# Patient Record
Sex: MALE | Race: WHITE | Employment: FULL TIME | URBAN - METROPOLITAN AREA
[De-identification: names, ages, dates, MRNs, and addresses within clinical notes are randomized per-mention and may not be internally consistent; named-entity substitution may affect disease eponyms.]

---

## 2020-10-22 ENCOUNTER — APPOINTMENT (OUTPATIENT)
Dept: GENERAL RADIOLOGY | Age: 60
End: 2020-10-22
Payer: COMMERCIAL

## 2020-10-22 ENCOUNTER — HOSPITAL ENCOUNTER (OUTPATIENT)
Age: 60
Setting detail: OBSERVATION
Discharge: LEFT BEFORE DISCHARGE | End: 2020-10-23
Attending: STUDENT IN AN ORGANIZED HEALTH CARE EDUCATION/TRAINING PROGRAM | Admitting: HOSPITALIST
Payer: COMMERCIAL

## 2020-10-22 PROBLEM — R07.9 CHEST PAIN: Status: ACTIVE | Noted: 2020-10-22

## 2020-10-22 LAB
A/G RATIO: 1.6 (ref 1.1–2.2)
ALBUMIN SERPL-MCNC: 4.4 G/DL (ref 3.4–5)
ALP BLD-CCNC: 67 U/L (ref 40–129)
ALT SERPL-CCNC: 26 U/L (ref 10–40)
ANION GAP SERPL CALCULATED.3IONS-SCNC: 10 MMOL/L (ref 3–16)
AST SERPL-CCNC: 21 U/L (ref 15–37)
BASOPHILS ABSOLUTE: 0.1 K/UL (ref 0–0.2)
BASOPHILS RELATIVE PERCENT: 1.2 %
BILIRUB SERPL-MCNC: 0.5 MG/DL (ref 0–1)
BUN BLDV-MCNC: 17 MG/DL (ref 7–20)
CALCIUM SERPL-MCNC: 9.2 MG/DL (ref 8.3–10.6)
CHLORIDE BLD-SCNC: 102 MMOL/L (ref 99–110)
CO2: 25 MMOL/L (ref 21–32)
CREAT SERPL-MCNC: 0.9 MG/DL (ref 0.9–1.3)
EOSINOPHILS ABSOLUTE: 0.2 K/UL (ref 0–0.6)
EOSINOPHILS RELATIVE PERCENT: 1.6 %
GFR AFRICAN AMERICAN: >60
GFR NON-AFRICAN AMERICAN: >60
GLOBULIN: 2.8 G/DL
GLUCOSE BLD-MCNC: 104 MG/DL (ref 70–99)
HCT VFR BLD CALC: 45.2 % (ref 40.5–52.5)
HEMOGLOBIN: 15.2 G/DL (ref 13.5–17.5)
LYMPHOCYTES ABSOLUTE: 2 K/UL (ref 1–5.1)
LYMPHOCYTES RELATIVE PERCENT: 19.6 %
MCH RBC QN AUTO: 32.5 PG (ref 26–34)
MCHC RBC AUTO-ENTMCNC: 33.5 G/DL (ref 31–36)
MCV RBC AUTO: 96.9 FL (ref 80–100)
MONOCYTES ABSOLUTE: 0.6 K/UL (ref 0–1.3)
MONOCYTES RELATIVE PERCENT: 6.3 %
NEUTROPHILS ABSOLUTE: 7.2 K/UL (ref 1.7–7.7)
NEUTROPHILS RELATIVE PERCENT: 71.3 %
PDW BLD-RTO: 14.3 % (ref 12.4–15.4)
PLATELET # BLD: 236 K/UL (ref 135–450)
PMV BLD AUTO: 8.4 FL (ref 5–10.5)
POTASSIUM REFLEX MAGNESIUM: 4.4 MMOL/L (ref 3.5–5.1)
PRO-BNP: 18 PG/ML (ref 0–124)
RBC # BLD: 4.67 M/UL (ref 4.2–5.9)
SODIUM BLD-SCNC: 137 MMOL/L (ref 136–145)
TOTAL PROTEIN: 7.2 G/DL (ref 6.4–8.2)
TROPONIN: <0.01 NG/ML
TROPONIN: <0.01 NG/ML
WBC # BLD: 10.1 K/UL (ref 4–11)

## 2020-10-22 PROCEDURE — 99285 EMERGENCY DEPT VISIT HI MDM: CPT

## 2020-10-22 PROCEDURE — 71045 X-RAY EXAM CHEST 1 VIEW: CPT

## 2020-10-22 PROCEDURE — 84484 ASSAY OF TROPONIN QUANT: CPT

## 2020-10-22 PROCEDURE — 80053 COMPREHEN METABOLIC PANEL: CPT

## 2020-10-22 PROCEDURE — 36415 COLL VENOUS BLD VENIPUNCTURE: CPT

## 2020-10-22 PROCEDURE — G0378 HOSPITAL OBSERVATION PER HR: HCPCS

## 2020-10-22 PROCEDURE — 80061 LIPID PANEL: CPT

## 2020-10-22 PROCEDURE — 2580000003 HC RX 258: Performed by: HOSPITALIST

## 2020-10-22 PROCEDURE — 83880 ASSAY OF NATRIURETIC PEPTIDE: CPT

## 2020-10-22 PROCEDURE — 6370000000 HC RX 637 (ALT 250 FOR IP): Performed by: STUDENT IN AN ORGANIZED HEALTH CARE EDUCATION/TRAINING PROGRAM

## 2020-10-22 PROCEDURE — 85025 COMPLETE CBC W/AUTO DIFF WBC: CPT

## 2020-10-22 PROCEDURE — 93005 ELECTROCARDIOGRAM TRACING: CPT | Performed by: STUDENT IN AN ORGANIZED HEALTH CARE EDUCATION/TRAINING PROGRAM

## 2020-10-22 RX ORDER — LISINOPRIL 20 MG/1
20 TABLET ORAL DAILY
Status: DISCONTINUED | OUTPATIENT
Start: 2020-10-23 | End: 2020-10-23 | Stop reason: HOSPADM

## 2020-10-22 RX ORDER — ONDANSETRON 2 MG/ML
4 INJECTION INTRAMUSCULAR; INTRAVENOUS EVERY 6 HOURS PRN
Status: DISCONTINUED | OUTPATIENT
Start: 2020-10-22 | End: 2020-10-23 | Stop reason: HOSPADM

## 2020-10-22 RX ORDER — NITROGLYCERIN 0.4 MG/1
0.4 TABLET SUBLINGUAL EVERY 5 MIN PRN
Status: DISCONTINUED | OUTPATIENT
Start: 2020-10-22 | End: 2020-10-23 | Stop reason: HOSPADM

## 2020-10-22 RX ORDER — ASPIRIN 325 MG
325 TABLET ORAL ONCE
Status: COMPLETED | OUTPATIENT
Start: 2020-10-22 | End: 2020-10-22

## 2020-10-22 RX ORDER — SODIUM CHLORIDE 0.9 % (FLUSH) 0.9 %
10 SYRINGE (ML) INJECTION EVERY 12 HOURS SCHEDULED
Status: DISCONTINUED | OUTPATIENT
Start: 2020-10-22 | End: 2020-10-23 | Stop reason: HOSPADM

## 2020-10-22 RX ORDER — POLYETHYLENE GLYCOL 3350 17 G/17G
17 POWDER, FOR SOLUTION ORAL DAILY PRN
Status: DISCONTINUED | OUTPATIENT
Start: 2020-10-22 | End: 2020-10-23 | Stop reason: HOSPADM

## 2020-10-22 RX ORDER — LISINOPRIL 20 MG/1
TABLET ORAL
COMMUNITY
Start: 2020-08-22

## 2020-10-22 RX ORDER — ACETAMINOPHEN 325 MG/1
650 TABLET ORAL EVERY 6 HOURS PRN
Status: DISCONTINUED | OUTPATIENT
Start: 2020-10-22 | End: 2020-10-23 | Stop reason: HOSPADM

## 2020-10-22 RX ORDER — NICOTINE 21 MG/24HR
1 PATCH, TRANSDERMAL 24 HOURS TRANSDERMAL DAILY
Status: DISCONTINUED | OUTPATIENT
Start: 2020-10-23 | End: 2020-10-23 | Stop reason: HOSPADM

## 2020-10-22 RX ORDER — SODIUM CHLORIDE 0.9 % (FLUSH) 0.9 %
10 SYRINGE (ML) INJECTION PRN
Status: DISCONTINUED | OUTPATIENT
Start: 2020-10-22 | End: 2020-10-23 | Stop reason: HOSPADM

## 2020-10-22 RX ORDER — ASPIRIN 81 MG/1
81 TABLET, CHEWABLE ORAL DAILY
Status: DISCONTINUED | OUTPATIENT
Start: 2020-10-23 | End: 2020-10-23 | Stop reason: HOSPADM

## 2020-10-22 RX ORDER — PROMETHAZINE HYDROCHLORIDE 25 MG/1
12.5 TABLET ORAL EVERY 6 HOURS PRN
Status: DISCONTINUED | OUTPATIENT
Start: 2020-10-22 | End: 2020-10-23 | Stop reason: HOSPADM

## 2020-10-22 RX ORDER — ACETAMINOPHEN 650 MG/1
650 SUPPOSITORY RECTAL EVERY 6 HOURS PRN
Status: DISCONTINUED | OUTPATIENT
Start: 2020-10-22 | End: 2020-10-23 | Stop reason: HOSPADM

## 2020-10-22 RX ADMIN — Medication 10 ML: at 23:36

## 2020-10-22 RX ADMIN — ASPIRIN 325 MG: 325 TABLET, FILM COATED ORAL at 21:50

## 2020-10-23 VITALS
WEIGHT: 229.5 LBS | TEMPERATURE: 98.2 F | BODY MASS INDEX: 28.54 KG/M2 | RESPIRATION RATE: 16 BRPM | DIASTOLIC BLOOD PRESSURE: 71 MMHG | HEIGHT: 75 IN | HEART RATE: 62 BPM | SYSTOLIC BLOOD PRESSURE: 110 MMHG | OXYGEN SATURATION: 98 %

## 2020-10-23 LAB
ALBUMIN SERPL-MCNC: 4 G/DL (ref 3.4–5)
ALP BLD-CCNC: 58 U/L (ref 40–129)
ALT SERPL-CCNC: 21 U/L (ref 10–40)
ANION GAP SERPL CALCULATED.3IONS-SCNC: 9 MMOL/L (ref 3–16)
AST SERPL-CCNC: 17 U/L (ref 15–37)
BASOPHILS ABSOLUTE: 0.1 K/UL (ref 0–0.2)
BASOPHILS RELATIVE PERCENT: 0.7 %
BILIRUB SERPL-MCNC: 0.7 MG/DL (ref 0–1)
BILIRUBIN DIRECT: <0.2 MG/DL (ref 0–0.3)
BILIRUBIN, INDIRECT: NORMAL MG/DL (ref 0–1)
BUN BLDV-MCNC: 15 MG/DL (ref 7–20)
CALCIUM SERPL-MCNC: 8.9 MG/DL (ref 8.3–10.6)
CHLORIDE BLD-SCNC: 103 MMOL/L (ref 99–110)
CHOLESTEROL, FASTING: 134 MG/DL (ref 0–199)
CO2: 22 MMOL/L (ref 21–32)
CREAT SERPL-MCNC: 0.8 MG/DL (ref 0.9–1.3)
EKG ATRIAL RATE: 92 BPM
EKG DIAGNOSIS: NORMAL
EKG P AXIS: 50 DEGREES
EKG P-R INTERVAL: 138 MS
EKG Q-T INTERVAL: 356 MS
EKG QRS DURATION: 84 MS
EKG QTC CALCULATION (BAZETT): 440 MS
EKG R AXIS: 43 DEGREES
EKG T AXIS: 18 DEGREES
EKG VENTRICULAR RATE: 92 BPM
EOSINOPHILS ABSOLUTE: 0.2 K/UL (ref 0–0.6)
EOSINOPHILS RELATIVE PERCENT: 3 %
GFR AFRICAN AMERICAN: >60
GFR NON-AFRICAN AMERICAN: >60
GLUCOSE BLD-MCNC: 100 MG/DL (ref 70–99)
HCT VFR BLD CALC: 42.7 % (ref 40.5–52.5)
HDLC SERPL-MCNC: 38 MG/DL (ref 40–60)
HEMOGLOBIN: 14.4 G/DL (ref 13.5–17.5)
LDL CHOLESTEROL CALCULATED: 82 MG/DL
LYMPHOCYTES ABSOLUTE: 1.9 K/UL (ref 1–5.1)
LYMPHOCYTES RELATIVE PERCENT: 26.1 %
MCH RBC QN AUTO: 32.7 PG (ref 26–34)
MCHC RBC AUTO-ENTMCNC: 33.7 G/DL (ref 31–36)
MCV RBC AUTO: 97.1 FL (ref 80–100)
MONOCYTES ABSOLUTE: 0.5 K/UL (ref 0–1.3)
MONOCYTES RELATIVE PERCENT: 6.9 %
NEUTROPHILS ABSOLUTE: 4.6 K/UL (ref 1.7–7.7)
NEUTROPHILS RELATIVE PERCENT: 63.3 %
PDW BLD-RTO: 14.5 % (ref 12.4–15.4)
PLATELET # BLD: 204 K/UL (ref 135–450)
PMV BLD AUTO: 8.8 FL (ref 5–10.5)
POTASSIUM REFLEX MAGNESIUM: 3.9 MMOL/L (ref 3.5–5.1)
RBC # BLD: 4.4 M/UL (ref 4.2–5.9)
SODIUM BLD-SCNC: 134 MMOL/L (ref 136–145)
TOTAL PROTEIN: 6.4 G/DL (ref 6.4–8.2)
TRIGLYCERIDE, FASTING: 70 MG/DL (ref 0–150)
TROPONIN: <0.01 NG/ML
VLDLC SERPL CALC-MCNC: 14 MG/DL
WBC # BLD: 7.3 K/UL (ref 4–11)

## 2020-10-23 PROCEDURE — 85025 COMPLETE CBC W/AUTO DIFF WBC: CPT

## 2020-10-23 PROCEDURE — 99217 PR OBSERVATION CARE DISCHARGE MANAGEMENT: CPT | Performed by: INTERNAL MEDICINE

## 2020-10-23 PROCEDURE — 80048 BASIC METABOLIC PNL TOTAL CA: CPT

## 2020-10-23 PROCEDURE — 36415 COLL VENOUS BLD VENIPUNCTURE: CPT

## 2020-10-23 PROCEDURE — 84484 ASSAY OF TROPONIN QUANT: CPT

## 2020-10-23 PROCEDURE — G0378 HOSPITAL OBSERVATION PER HR: HCPCS

## 2020-10-23 PROCEDURE — 80076 HEPATIC FUNCTION PANEL: CPT

## 2020-10-23 PROCEDURE — 93010 ELECTROCARDIOGRAM REPORT: CPT | Performed by: INTERNAL MEDICINE

## 2020-10-23 NOTE — PROGRESS NOTES
I talked to the patient about having a stress test done today. Patient refused and said he wants to leave AMA. I told him the risk of having a heart attack and dying from it. He still says that he is noted to have a stress test and he wanted to leave AMA. He denies any suicidality. He is having some personal issues and just wants to go home.         Vilma Katz 10/23/2020 7:55 AM

## 2020-10-23 NOTE — H&P
Hospital Medicine History & Physical      PCP: No primary care provider on file. Date of Admission: 10/22/2020    Date of Service: Pt seen/examined on  10/22/2020 and   Placed in Observation. Chief Complaint:  Chest pain      History Of Present Illness:      61 y.o. male presents following an episode of chest pain with exertion today. Patient denies leg swelling/ calf pain, sob, cough, fever, or increased KLINE. He denies associated lightheadedness, dizziness, palpitations, nausea, vomiting, diaphoresis with the episode described as a sharp pain, lasting seconds across precordium without radiatio to arm/neck/shoulder. Past Medical History:          Diagnosis Date    Hypertension        Past Surgical History:      History reviewed. No pertinent surgical history. Medications Prior to Admission:      Prior to Admission medications    Medication Sig Start Date End Date Taking? Authorizing Provider   lisinopril (PRINIVIL;ZESTRIL) 20 MG tablet TAKE 1 TABLET BY MOUTH EVERY DAY 8/22/20  Yes Historical Provider, MD       Allergies:  Patient has no known allergies. Social History:           TOBACCO:   reports that he has been smoking. He has been smoking about 1.00 pack per day. He has never used smokeless tobacco.  ETOH:   reports current alcohol use. Family History:      (+) premature CAD - Sister with AMI in her 29's    REVIEW OF SYSTEMS:   Pertinent positives as noted in the HPI. All other systems reviewed and negative. PHYSICAL EXAM PERFORMED:    /82   Pulse 81   Temp 98.2 °F (36.8 °C) (Oral)   Resp 18   Ht 6' 3\" (1.905 m)   Wt 240 lb (108.9 kg)   SpO2 96%   BMI 30.00 kg/m²     General appearance:  No apparent distress, appears stated age and cooperative. HEENT:  Normal cephalic, atraumatic without obvious deformity. Pupils equal, round, and reactive to light. Extra ocular muscles intact. Conjunctivae/corneas clear. Neck: Supple, with full range of motion.  No jugular venous distention. Trachea midline. Respiratory:  Normal respiratory effort. Clear to auscultation, bilaterally without Rales/Wheezes/Rhonchi. Cardiovascular:  Regular rate and rhythm with normal S1/S2 without murmurs, rubs or gallops. Abdomen: Soft, non-tender, non-distended with normal bowel sounds. Musculoskeletal:  No clubbing, cyanosis or edema bilaterally. Full range of motion without deformity. Skin: Skin color, texture, turgor normal.  No rashes or lesions. Neurologic:  Neurovascularly intact without any focal sensory/motor deficits. Cranial nerves: II-XII intact, grossly non-focal.  Psychiatric:  Alert and oriented, thought content appropriate, normal insight  Capillary Refill: Brisk,< 3 seconds   Peripheral Pulses: +2 palpable, equal bilaterally       Labs:     Recent Labs     10/22/20  1945   WBC 10.1   HGB 15.2   HCT 45.2        Recent Labs     10/22/20  1945      K 4.4      CO2 25   BUN 17   CREATININE 0.9   CALCIUM 9.2     Recent Labs     10/22/20  1945   AST 21   ALT 26   BILITOT 0.5   ALKPHOS 67     No results for input(s): INR in the last 72 hours. Recent Labs     10/22/20  1945 10/22/20  2313   TROPONINI <0.01 <0.01       Urinalysis:    No results found for: Elnita Danker, BACTERIA, RBCUA, BLOODU, SPECGRAV, GLUCOSEU    Radiology:          XR CHEST PORTABLE   Final Result   Unremarkable portable chest radiograph. NM Cardiac Stress Test Nuclear Imaging    (Results Pending)       ASSESSMENT:    Active Hospital Problems    Diagnosis Date Noted    Chest pain [R07.9] 10/22/2020         PLAN:    1) CP  - check flp  - tele  - asa  - serial trop  - stress in am    2) HTN  - continue lisinopril    DVT Prophylaxis: lovenox  Diet: Diet NPO, After Midnight  Code Status: Full Code         Lenka Gonzales MD    Thank you No primary care provider on file. for the opportunity to be involved in this patient's care.  If you have any questions or concerns please feel free to contact me at [phone].

## 2020-10-23 NOTE — DISCHARGE SUMMARY
Name:  Amara Curran  Room:  /1401-84  MRN:    [id number]    [facility] Discharge Summary      This discharge summary is in conjunction with a complete physical exam done on the day of discharge. Discharging Physician: Dr. Thao Ulloa: 10/22/2020  Discharge:  10/23/2020    HPI taken from admission H&P:    61 y.o. male presents following an episode of chest pain with exertion today. Patient denies leg swelling/ calf pain, sob, cough, fever, or increased KLINE. He denies associated lightheadedness, dizziness, palpitations, nausea, vomiting, diaphoresis with the episode described as a sharp pain, lasting seconds across precordium without radiatio to arm/neck/shoulder. Diagnoses this Admission and Hospital Course   1) CP  - tele  - lipid panel: HDL mildly low  - asa  - serial trop--negative x 3  - stress in am--patient refused stress, see below, signed out AMA.    2) HTN  - continue lisinopril    \"I talked to the patient about having a stress test done today. Patient refused and said he wants to leave AMA. I told him the risk of having a heart attack and dying from it. He still says that he is noted to have a stress test and he wanted to leave AMA. He denies any suicidality. He is having some personal issues and just wants to go home. \"    Procedures (Please Review Full Report for Details)  None     Consults    None     Physical Exam at Discharge:    /71   Pulse 62   Temp 98.2 °F (36.8 °C) (Oral)   Resp 16   Ht 6' 3\" (1.905 m)   Wt 229 lb 8 oz (104.1 kg)   SpO2 98%   BMI 28.69 kg/m²     General appearance:  No apparent distress, appears stated age and cooperative. HEENT:  Normal cephalic, atraumatic without obvious deformity. Pupils equal, round, and reactive to light. Extra ocular muscles intact. Conjunctivae/corneas clear. Neck: Supple, with full range of motion. No jugular venous distention. Trachea midline. Respiratory:  Normal respiratory effort.  Clear to auscultation, bilaterally without Rales/Wheezes/Rhonchi. Cardiovascular:  Regular rate and rhythm with normal S1/S2 without murmurs, rubs or gallops. Abdomen: Soft, non-tender, non-distended with normal bowel sounds. Musculoskeletal:  No clubbing, cyanosis or edema bilaterally. Full range of motion without deformity. Skin: Skin color, texture, turgor normal.  No rashes or lesions. Neurologic:  Neurovascularly intact without any focal sensory/motor deficits. Cranial nerves: II-XII intact, grossly non-focal.  Psychiatric:  Alert and oriented, thought content appropriate, normal insight  Capillary Refill: Brisk,< 3 seconds   Peripheral Pulses: +2 palpable, equal bilaterally        CBC:   Recent Labs     10/22/20  1945 10/23/20  0459   WBC 10.1 7.3   HGB 15.2 14.4   HCT 45.2 42.7   MCV 96.9 97.1    204     BMP:   Recent Labs     10/22/20  1945 10/23/20  0459    134*   K 4.4 3.9    103   CO2 25 22   BUN 17 15   CREATININE 0.9 0.8*     LIVER PROFILE:   Recent Labs     10/22/20  1945 10/23/20  0459   AST 21 17   ALT 26 21   BILIDIR  --  <0.2   BILITOT 0.5 0.7   ALKPHOS 67 58     CULTURES  None     RADIOLOGY  XR CHEST PORTABLE   Final Result   Unremarkable portable chest radiograph. Discharge Medications     Medication List      CONTINUE taking these medications    lisinopril 20 MG tablet  Commonly known as:  PRINIVIL;ZESTRIL              Discharged AMA to home    Follow Up: Follow up with PCP      The patient has decided to leave against medical advice. He has normal mental status and adequate capacity to make medical decisions. The patient refuses hospital admission and wants to be discharged. The risks have been explained to the patient, including worsening illness, chronic pain, permanent disability, and death. The benefits of admission have also been explained, including the availability and proximity of nurses, physicians, monitoring, diagnostic testing, and treatment.   The patient was able to understand and state the risks and benefits of hospital admission. This was witnessed by nurse Rosendo Mcburney and me. He had the opportunity to ask questions about his medical condition. The patient was treated to the extent that he would allow, and knows that he may return for care at any time.        Gopi Arellano 10/24/2020 5:12 PM

## 2020-10-23 NOTE — PROGRESS NOTES
RN noticed patient was back from stress lab and asked what happened. He states that when he got down there he just refused to have it done and he does not feel that it is necessary. Charge RN and MD aware of situation.

## 2020-10-23 NOTE — ED NOTES
326 at [facility];  [facility]     [facility]  10/22/20 1549

## 2020-10-23 NOTE — PROGRESS NOTES
Patient admitted to room 326 from [facility] ed. Patient oriented to room, call light, bed rails, phone, lights and bathroom. Patient instructed about the schedule of the day including: vital sign frequency, lab draws, possible tests, frequency of MD and staff rounds, daily weights, I &O's and prescribed diet. Telemetry box in place, patient aware of placement and reason. Bed locked, in lowest position, side rails up 2/4, call light within reach. Assessment completed, see flow sheet. Updated on NPO status at midnight and testing. Pt verbalizes understanding. Recliner Assessment  Patient is able to demonstrated the ability to move from a reclining position to an upright position within the recliner. 4 Eyes Skin Assessment     The patient is being assess for   Admission    I agree that 2 RN's have performed a thorough Head to Toe Skin Assessment on the patient. ALL assessment sites listed below have been assessed. Areas assessed by both nurses:   [x]   Head, Face, and Ears   [x]   Shoulders, Back, and Chest, Abdomen  [x]   Arms, Elbows, and Hands   [x]   Coccyx, Sacrum, and Ischium  [x]   Legs, Feet, and Heels        No altered skin integrity noted. **SHARE this note so that the co-signing nurse is able to place an eSignature**    Co-signer eSignature: {Esignature:[id number]}    Does the Patient have Skin Breakdown?   No          Renny Prevention initiated:  No   Wound Care Orders initiated:  No      C nurse consulted for Pressure Injury (Stage 3,4, Unstageable, DTI, NWPT, Complex wounds)and New or Established Ostomies:  No      Primary Nurse eSignature: Electronically signed by Issa Mcclain RN on 10/22/20 at 11:21 PM EDT

## 2020-10-23 NOTE — ED PROVIDER NOTES
[address] [facility] COMPLAINT  Chest Pain (patient states he had shooting pains through his chest earlier today, but denies any chest pain right now. )       HISTORY OF PRESENT ILLNESS  Theron Varela is a 61 y.o. male with a past medical history of hypertension, who presents to the ED complaining of chest pain. 1m ago had an episode of chest heaviness- had arm heaviness at that time as well. Today had brief episode of shooting chest pain while walking. Middle of chest. Couple episodes today. Last chest pain ~5p. Chest pain at this time. Denies associated SOB, sweating, nasuea. States that he does not follow with the doctor regularly. Family history (sibling with triple Bypasss at 28,  in early 46s of heart attack) parents with heart disease. Denies previous cardiac evaluation. Patient denies previous blood clot or active malignancy, leg swelling, hemoptysis, recent travel or surgery/prolonged immobilization, or OCP or other hormone use. Patient does smoke. No other complaints, modifying factors or associated symptoms. I have reviewed the following from the nursing documentation. Past Medical History:   Diagnosis Date    Hypertension      History reviewed. No pertinent surgical history. History reviewed. No pertinent family history.   Social History     Socioeconomic History    Marital status: Not on file     Spouse name: Not on file    Number of children: Not on file    Years of education: Not on file    Highest education level: Not on file   Occupational History    Not on file   Social Needs    Financial resource strain: Not on file    Food insecurity     Worry: Not on file     Inability: Not on file    Transportation needs     Medical: Not on file     Non-medical: Not on file   Tobacco Use    Smoking status: Current Every Day Smoker    Smokeless tobacco: Never Used   Substance and Sexual Activity    Alcohol use: Yes     Comment: occasional     Drug use: Never    Sexual activity: Not on file   Lifestyle    Physical activity     Days per week: Not on file     Minutes per session: Not on file    Stress: Not on file   Relationships    Social connections     Talks on phone: Not on file     Gets together: Not on file     Attends [employer] service: Not on file     Active member of club or organization: Not on file     Attends meetings of clubs or organizations: Not on file     Relationship status: Not on file    Intimate partner violence     Fear of current or ex partner: Not on file     Emotionally abused: Not on file     Physically abused: Not on file     Forced sexual activity: Not on file   Other Topics Concern    Not on file   Social History Narrative    Not on file     No current facility-administered medications for this encounter. Current Outpatient Medications   Medication Sig Dispense Refill    lisinopril (PRINIVIL;ZESTRIL) 20 MG tablet TAKE 1 TABLET BY MOUTH EVERY DAY       No Known Allergies    REVIEW OF SYSTEMS  10 systems reviewed, pertinent positives per HPI otherwise noted to be negative. PHYSICAL EXAM  /86   Pulse 93   Temp 98.4 °F (36.9 °C) (Oral)   Resp 13   Ht 6' 3\" (1.905 m)   Wt 240 lb (108.9 kg)   SpO2 99%   BMI 30.00 kg/m²    GENERAL APPEARANCE: Awake and alert. Cooperative. No acute distress. HENT: Normocephalic. Atraumatic. Mucous membranes are moist  NECK: Supple. Full range of motion of the neck without stiffness or pain. EYES: PERRL. EOM's grossly intact. HEART/CHEST: RRR. No murmurs. Chest wall is not tender to palpation. LUNGS: Respirations unlabored. CTAB. Good air exchange. Speaking comfortably in full sentences. ABDOMEN: No tenderness. Soft. Non-distended. No masses. No organomegaly. No guarding or rebound. MUSCULOSKELETAL: No extremity edema. Compartments soft. No deformity. No tenderness in the extremities. All extremities neurovascularly intact. SKIN: Warm and dry. No acute rashes. NEUROLOGICAL: Alert and oriented. CN's 2-12 intact. No gross facial drooping. Strength 5/5, sensation intact. PSYCHIATRIC: Normal mood and affect. LABS  I have reviewed all labs for this visit. Results for orders placed or performed during the hospital encounter of 10/22/20   CBC Auto Differential   Result Value Ref Range    WBC 10.1 4.0 - 11.0 K/uL    RBC 4.67 4.20 - 5.90 M/uL    Hemoglobin 15.2 13.5 - 17.5 g/dL    Hematocrit 45.2 40.5 - 52.5 %    MCV 96.9 80.0 - 100.0 fL    MCH 32.5 26.0 - 34.0 pg    MCHC 33.5 31.0 - 36.0 g/dL    RDW 14.3 12.4 - 15.4 %    Platelets 182 289 - 854 K/uL    MPV 8.4 5.0 - 10.5 fL    Neutrophils % 71.3 %    Lymphocytes % 19.6 %    Monocytes % 6.3 %    Eosinophils % 1.6 %    Basophils % 1.2 %    Neutrophils Absolute 7.2 1.7 - 7.7 K/uL    Lymphocytes Absolute 2.0 1.0 - 5.1 K/uL    Monocytes Absolute 0.6 0.0 - 1.3 K/uL    Eosinophils Absolute 0.2 0.0 - 0.6 K/uL    Basophils Absolute 0.1 0.0 - 0.2 K/uL   EKG 12 Lead   Result Value Ref Range    Ventricular Rate 92 BPM    Atrial Rate 92 BPM    P-R Interval 138 ms    QRS Duration 84 ms    Q-T Interval 356 ms    QTc Calculation (Bazett) 440 ms    P Axis 50 degrees    R Axis 43 degrees    T Axis 18 degrees    Diagnosis       Normal sinus rhythmNormal ECGNo previous ECGs available       ECG  The Ekg interpreted by me shows  normal sinus rhythm with a rate of 92  Axis is   Normal  QTc is  within an acceptable range  Intervals and Durations are unremarkable. ST Segments: nonspecific changes  No previous for comparison    RADIOLOGY    XR CHEST PORTABLE   Final Result   Unremarkable portable chest radiograph. NM Cardiac Stress Test Nuclear Imaging    (Results Pending)          ED COURSE/MDM  Patient seen and evaluated. Old records reviewed. Labs and imaging reviewed and results discussed with patient. Overall well appearing patient, in no acute distress, presenting for chest pain.   Physical exam time I have low concern for pulmonary embolism. Patient has not had a previous blood clot. Patient denies other risk factors for pulmonary embolism. Patient does not have any evidence of DVT on exam.  Patient is low risk on PERC and Wells criteria. At this time, considering that risks associated with further work-up for pulmonary embolism outweigh the likelihood of this diagnosis. Low suspicion for aortic pathology. Patient is not hypertensive. Patient has strong pulses in the bilateral radial and femoral arteries. Pain was not maximal at onset. There is no evidence of mediastinal widening on chest x-ray. Patient does not have any neurologic deficits. The evidence indicates that the patient is very low risk for Aortic Dissection and this is consistent with my clinical intuition. The risk of further workup or hospitalization for aortic dissection is likely higher than the risk of the patient having an aortic dissection. Low suspicion for esophageal perforation. Patient has not had vomiting. There is no crepitus on exam.  No subcutaneous air or pneumomediastinum on chest x-ray. No significant electrolyte abnormalities or evidence of kidney dysfunction. No leukocytosis, anemia, or thrombocytopenia. Based on results of work-up, I am concerned for chest pain in an individual who is moderate risk and I have concern for his ability to follow-up on an outpatient basis. At this time, do feel the patient requires admission for further work-up and management. Discussed the patient with hospital team.      CLINICAL IMPRESSION  1. Chest pain, unspecified type        Blood pressure 110/71, pulse 62, temperature 98.2 °F (36.8 °C), temperature source Oral, resp. rate 16, height 6' 3\" (1.905 m), weight 229 lb 12.8 oz (104.2 kg), SpO2 98 %. DISPOSITION  Theron Lawrence was transferred to [facility] in stable condition. DISCLAIMER: This chart was created using Dragon dictation software. Efforts were made by me to ensure accuracy, however some errors may be present due to limitations of this technology and occasionally words are not transcribed correctly.             Nataliya Hinson MD  10/23/20 1999

## 2020-10-23 NOTE — PROGRESS NOTES
Patient's IV and telemetry were removed. Patient is calling a ride and will notify me when he is getting ready to leave the premises.

## 2020-10-26 ENCOUNTER — APPOINTMENT (OUTPATIENT)
Dept: GENERAL RADIOLOGY | Age: 60
End: 2020-10-26
Payer: COMMERCIAL

## 2020-10-26 ENCOUNTER — HOSPITAL ENCOUNTER (OUTPATIENT)
Age: 60
Setting detail: OBSERVATION
Discharge: HOME | End: 2020-10-27
Attending: EMERGENCY MEDICINE | Admitting: INTERNAL MEDICINE
Payer: COMMERCIAL

## 2020-10-26 LAB
A/G RATIO: 1.7 (ref 1.1–2.2)
ALBUMIN SERPL-MCNC: 4.8 G/DL (ref 3.4–5)
ALP BLD-CCNC: 70 U/L (ref 40–129)
ALT SERPL-CCNC: 24 U/L (ref 10–40)
ANION GAP SERPL CALCULATED.3IONS-SCNC: 8 MMOL/L (ref 3–16)
AST SERPL-CCNC: 19 U/L (ref 15–37)
BASOPHILS ABSOLUTE: 0.3 K/UL (ref 0–0.2)
BASOPHILS RELATIVE PERCENT: 2.6 %
BILIRUB SERPL-MCNC: 0.4 MG/DL (ref 0–1)
BUN BLDV-MCNC: 15 MG/DL (ref 7–20)
CALCIUM SERPL-MCNC: 9.6 MG/DL (ref 8.3–10.6)
CHLORIDE BLD-SCNC: 100 MMOL/L (ref 99–110)
CO2: 26 MMOL/L (ref 21–32)
CREAT SERPL-MCNC: 0.9 MG/DL (ref 0.8–1.3)
EOSINOPHILS ABSOLUTE: 0.2 K/UL (ref 0–0.6)
EOSINOPHILS RELATIVE PERCENT: 1.4 %
GFR AFRICAN AMERICAN: >60
GFR NON-AFRICAN AMERICAN: >60
GLOBULIN: 2.8 G/DL
GLUCOSE BLD-MCNC: 115 MG/DL (ref 70–99)
HCT VFR BLD CALC: 47.2 % (ref 40.5–52.5)
HEMOGLOBIN: 15.9 G/DL (ref 13.5–17.5)
LYMPHOCYTES ABSOLUTE: 2.2 K/UL (ref 1–5.1)
LYMPHOCYTES RELATIVE PERCENT: 19.2 %
MAGNESIUM: 2.2 MG/DL (ref 1.8–2.4)
MCH RBC QN AUTO: 33 PG (ref 26–34)
MCHC RBC AUTO-ENTMCNC: 33.6 G/DL (ref 31–36)
MCV RBC AUTO: 98 FL (ref 80–100)
MONOCYTES ABSOLUTE: 0.6 K/UL (ref 0–1.3)
MONOCYTES RELATIVE PERCENT: 4.9 %
NEUTROPHILS ABSOLUTE: 8.1 K/UL (ref 1.7–7.7)
NEUTROPHILS RELATIVE PERCENT: 71.9 %
PDW BLD-RTO: 14.1 % (ref 12.4–15.4)
PLATELET # BLD: 247 K/UL (ref 135–450)
PMV BLD AUTO: 8.6 FL (ref 5–10.5)
POTASSIUM REFLEX MAGNESIUM: 3.8 MMOL/L (ref 3.5–5.1)
PRO-BNP: 271 PG/ML (ref 0–124)
RBC # BLD: 4.82 M/UL (ref 4.2–5.9)
SODIUM BLD-SCNC: 134 MMOL/L (ref 136–145)
TOTAL PROTEIN: 7.6 G/DL (ref 6.4–8.2)
TROPONIN: <0.01 NG/ML
TROPONIN: <0.01 NG/ML
WBC # BLD: 11.3 K/UL (ref 4–11)

## 2020-10-26 PROCEDURE — 6370000000 HC RX 637 (ALT 250 FOR IP): Performed by: INTERNAL MEDICINE

## 2020-10-26 PROCEDURE — 80053 COMPREHEN METABOLIC PANEL: CPT

## 2020-10-26 PROCEDURE — 83735 ASSAY OF MAGNESIUM: CPT

## 2020-10-26 PROCEDURE — 84484 ASSAY OF TROPONIN QUANT: CPT

## 2020-10-26 PROCEDURE — 2580000003 HC RX 258: Performed by: INTERNAL MEDICINE

## 2020-10-26 PROCEDURE — 83880 ASSAY OF NATRIURETIC PEPTIDE: CPT

## 2020-10-26 PROCEDURE — 71046 X-RAY EXAM CHEST 2 VIEWS: CPT

## 2020-10-26 PROCEDURE — 36415 COLL VENOUS BLD VENIPUNCTURE: CPT

## 2020-10-26 PROCEDURE — 6370000000 HC RX 637 (ALT 250 FOR IP): Performed by: NURSE PRACTITIONER

## 2020-10-26 PROCEDURE — 85025 COMPLETE CBC W/AUTO DIFF WBC: CPT

## 2020-10-26 PROCEDURE — 99285 EMERGENCY DEPT VISIT HI MDM: CPT

## 2020-10-26 PROCEDURE — G0378 HOSPITAL OBSERVATION PER HR: HCPCS

## 2020-10-26 PROCEDURE — 93005 ELECTROCARDIOGRAM TRACING: CPT | Performed by: EMERGENCY MEDICINE

## 2020-10-26 RX ORDER — SODIUM CHLORIDE 0.9 % (FLUSH) 0.9 %
10 SYRINGE (ML) INJECTION PRN
Status: DISCONTINUED | OUTPATIENT
Start: 2020-10-26 | End: 2020-10-27 | Stop reason: HOSPADM

## 2020-10-26 RX ORDER — POTASSIUM CHLORIDE 7.45 MG/ML
10 INJECTION INTRAVENOUS PRN
Status: DISCONTINUED | OUTPATIENT
Start: 2020-10-26 | End: 2020-10-27 | Stop reason: HOSPADM

## 2020-10-26 RX ORDER — MAGNESIUM SULFATE 1 G/100ML
1 INJECTION INTRAVENOUS PRN
Status: DISCONTINUED | OUTPATIENT
Start: 2020-10-26 | End: 2020-10-27 | Stop reason: HOSPADM

## 2020-10-26 RX ORDER — ATORVASTATIN CALCIUM 10 MG/1
20 TABLET, FILM COATED ORAL NIGHTLY
Status: DISCONTINUED | OUTPATIENT
Start: 2020-10-26 | End: 2020-10-27 | Stop reason: HOSPADM

## 2020-10-26 RX ORDER — SODIUM CHLORIDE 9 MG/ML
INJECTION, SOLUTION INTRAVENOUS CONTINUOUS
Status: DISCONTINUED | OUTPATIENT
Start: 2020-10-26 | End: 2020-10-27

## 2020-10-26 RX ORDER — ASPIRIN 81 MG/1
81 TABLET, CHEWABLE ORAL DAILY
Status: DISCONTINUED | OUTPATIENT
Start: 2020-10-27 | End: 2020-10-27 | Stop reason: HOSPADM

## 2020-10-26 RX ORDER — ACETAMINOPHEN 650 MG/1
650 SUPPOSITORY RECTAL EVERY 6 HOURS PRN
Status: DISCONTINUED | OUTPATIENT
Start: 2020-10-26 | End: 2020-10-27 | Stop reason: HOSPADM

## 2020-10-26 RX ORDER — ACETAMINOPHEN 325 MG/1
650 TABLET ORAL EVERY 6 HOURS PRN
Status: DISCONTINUED | OUTPATIENT
Start: 2020-10-26 | End: 2020-10-27 | Stop reason: HOSPADM

## 2020-10-26 RX ORDER — SODIUM CHLORIDE 0.9 % (FLUSH) 0.9 %
10 SYRINGE (ML) INJECTION EVERY 12 HOURS SCHEDULED
Status: DISCONTINUED | OUTPATIENT
Start: 2020-10-26 | End: 2020-10-27 | Stop reason: HOSPADM

## 2020-10-26 RX ORDER — PROMETHAZINE HYDROCHLORIDE 25 MG/1
12.5 TABLET ORAL EVERY 6 HOURS PRN
Status: DISCONTINUED | OUTPATIENT
Start: 2020-10-26 | End: 2020-10-27 | Stop reason: HOSPADM

## 2020-10-26 RX ORDER — ONDANSETRON 2 MG/ML
4 INJECTION INTRAMUSCULAR; INTRAVENOUS EVERY 6 HOURS PRN
Status: DISCONTINUED | OUTPATIENT
Start: 2020-10-26 | End: 2020-10-27 | Stop reason: HOSPADM

## 2020-10-26 RX ORDER — POTASSIUM CHLORIDE 20 MEQ/1
40 TABLET, EXTENDED RELEASE ORAL PRN
Status: DISCONTINUED | OUTPATIENT
Start: 2020-10-26 | End: 2020-10-27 | Stop reason: HOSPADM

## 2020-10-26 RX ORDER — ASPIRIN 325 MG
325 TABLET ORAL ONCE
Status: COMPLETED | OUTPATIENT
Start: 2020-10-26 | End: 2020-10-26

## 2020-10-26 RX ORDER — MORPHINE SULFATE 2 MG/ML
2 INJECTION, SOLUTION INTRAMUSCULAR; INTRAVENOUS
Status: DISCONTINUED | OUTPATIENT
Start: 2020-10-26 | End: 2020-10-27 | Stop reason: HOSPADM

## 2020-10-26 RX ORDER — POLYETHYLENE GLYCOL 3350 17 G/17G
17 POWDER, FOR SOLUTION ORAL DAILY PRN
Status: DISCONTINUED | OUTPATIENT
Start: 2020-10-26 | End: 2020-10-27 | Stop reason: HOSPADM

## 2020-10-26 RX ORDER — LISINOPRIL 20 MG/1
20 TABLET ORAL DAILY
Status: DISCONTINUED | OUTPATIENT
Start: 2020-10-27 | End: 2020-10-27 | Stop reason: HOSPADM

## 2020-10-26 RX ORDER — NICOTINE 21 MG/24HR
1 PATCH, TRANSDERMAL 24 HOURS TRANSDERMAL DAILY
Status: DISCONTINUED | OUTPATIENT
Start: 2020-10-26 | End: 2020-10-27 | Stop reason: HOSPADM

## 2020-10-26 RX ORDER — NITROGLYCERIN 0.4 MG/1
0.4 TABLET SUBLINGUAL EVERY 5 MIN PRN
Status: DISCONTINUED | OUTPATIENT
Start: 2020-10-26 | End: 2020-10-27 | Stop reason: HOSPADM

## 2020-10-26 RX ADMIN — ASPIRIN 325 MG: 325 TABLET, FILM COATED ORAL at 20:33

## 2020-10-26 RX ADMIN — ATORVASTATIN CALCIUM 20 MG: 10 TABLET, FILM COATED ORAL at 23:39

## 2020-10-26 RX ADMIN — SODIUM CHLORIDE: 9 INJECTION, SOLUTION INTRAVENOUS at 23:39

## 2020-10-26 ASSESSMENT — HEART SCORE: ECG: 0

## 2020-10-26 ASSESSMENT — PAIN SCALES - GENERAL
PAINLEVEL_OUTOF10: 6
PAINLEVEL_OUTOF10: 0

## 2020-10-26 ASSESSMENT — ENCOUNTER SYMPTOMS
ABDOMINAL PAIN: 0
COLOR CHANGE: 0
SHORTNESS OF BREATH: 0
RHINORRHEA: 0
SORE THROAT: 0

## 2020-10-27 ENCOUNTER — APPOINTMENT (OUTPATIENT)
Dept: NUCLEAR MEDICINE | Age: 60
End: 2020-10-27
Payer: COMMERCIAL

## 2020-10-27 VITALS
RESPIRATION RATE: 16 BRPM | DIASTOLIC BLOOD PRESSURE: 64 MMHG | SYSTOLIC BLOOD PRESSURE: 92 MMHG | HEART RATE: 84 BPM | WEIGHT: 227.4 LBS | OXYGEN SATURATION: 98 % | BODY MASS INDEX: 27.69 KG/M2 | HEIGHT: 76 IN | TEMPERATURE: 97.8 F

## 2020-10-27 PROBLEM — R07.9 CHEST PAIN: Status: RESOLVED | Noted: 2020-10-22 | Resolved: 2020-10-27

## 2020-10-27 LAB
A/G RATIO: 1.6 (ref 1.1–2.2)
ALBUMIN SERPL-MCNC: 3.9 G/DL (ref 3.4–5)
ALP BLD-CCNC: 62 U/L (ref 40–129)
ALT SERPL-CCNC: 18 U/L (ref 10–40)
ANION GAP SERPL CALCULATED.3IONS-SCNC: 6 MMOL/L (ref 3–16)
AST SERPL-CCNC: 16 U/L (ref 15–37)
BILIRUB SERPL-MCNC: 0.3 MG/DL (ref 0–1)
BUN BLDV-MCNC: 15 MG/DL (ref 7–20)
CALCIUM SERPL-MCNC: 8.8 MG/DL (ref 8.3–10.6)
CHLORIDE BLD-SCNC: 103 MMOL/L (ref 99–110)
CHOLESTEROL, TOTAL: 123 MG/DL (ref 0–199)
CO2: 23 MMOL/L (ref 21–32)
CREAT SERPL-MCNC: 0.8 MG/DL (ref 0.8–1.3)
GFR AFRICAN AMERICAN: >60
GFR NON-AFRICAN AMERICAN: >60
GLOBULIN: 2.4 G/DL
GLUCOSE BLD-MCNC: 117 MG/DL (ref 70–99)
HCT VFR BLD CALC: 42.6 % (ref 40.5–52.5)
HDLC SERPL-MCNC: 32 MG/DL (ref 40–60)
HEMOGLOBIN: 14.4 G/DL (ref 13.5–17.5)
LDL CHOLESTEROL CALCULATED: 71 MG/DL
LV EF: 78 %
LVEF MODALITY: NORMAL
MCH RBC QN AUTO: 33.3 PG (ref 26–34)
MCHC RBC AUTO-ENTMCNC: 33.8 G/DL (ref 31–36)
MCV RBC AUTO: 98.7 FL (ref 80–100)
PDW BLD-RTO: 14.4 % (ref 12.4–15.4)
PLATELET # BLD: 190 K/UL (ref 135–450)
PMV BLD AUTO: 8.6 FL (ref 5–10.5)
POTASSIUM REFLEX MAGNESIUM: 3.8 MMOL/L (ref 3.5–5.1)
RBC # BLD: 4.32 M/UL (ref 4.2–5.9)
SODIUM BLD-SCNC: 132 MMOL/L (ref 136–145)
TOTAL PROTEIN: 6.3 G/DL (ref 6.4–8.2)
TRIGL SERPL-MCNC: 100 MG/DL (ref 0–150)
TROPONIN: <0.01 NG/ML
VLDLC SERPL CALC-MCNC: 20 MG/DL
WBC # BLD: 8.4 K/UL (ref 4–11)

## 2020-10-27 PROCEDURE — 93005 ELECTROCARDIOGRAM TRACING: CPT | Performed by: INTERNAL MEDICINE

## 2020-10-27 PROCEDURE — 85027 COMPLETE CBC AUTOMATED: CPT

## 2020-10-27 PROCEDURE — 6370000000 HC RX 637 (ALT 250 FOR IP): Performed by: INTERNAL MEDICINE

## 2020-10-27 PROCEDURE — 80053 COMPREHEN METABOLIC PANEL: CPT

## 2020-10-27 PROCEDURE — 3430000000 HC RX DIAGNOSTIC RADIOPHARMACEUTICAL: Performed by: INTERNAL MEDICINE

## 2020-10-27 PROCEDURE — G0378 HOSPITAL OBSERVATION PER HR: HCPCS

## 2020-10-27 PROCEDURE — 80061 LIPID PANEL: CPT

## 2020-10-27 PROCEDURE — A9502 TC99M TETROFOSMIN: HCPCS | Performed by: INTERNAL MEDICINE

## 2020-10-27 PROCEDURE — 36415 COLL VENOUS BLD VENIPUNCTURE: CPT

## 2020-10-27 PROCEDURE — 78452 HT MUSCLE IMAGE SPECT MULT: CPT

## 2020-10-27 PROCEDURE — 99217 PR OBSERVATION CARE DISCHARGE MANAGEMENT: CPT | Performed by: INTERNAL MEDICINE

## 2020-10-27 PROCEDURE — 93017 CV STRESS TEST TRACING ONLY: CPT

## 2020-10-27 PROCEDURE — 84484 ASSAY OF TROPONIN QUANT: CPT

## 2020-10-27 PROCEDURE — 2580000003 HC RX 258: Performed by: INTERNAL MEDICINE

## 2020-10-27 RX ADMIN — TETROFOSMIN 32.7 MILLICURIE: 1.38 INJECTION, POWDER, LYOPHILIZED, FOR SOLUTION INTRAVENOUS at 11:30

## 2020-10-27 RX ADMIN — LISINOPRIL 20 MG: 20 TABLET ORAL at 09:16

## 2020-10-27 RX ADMIN — TETROFOSMIN 10.7 MILLICURIE: 1.38 INJECTION, POWDER, LYOPHILIZED, FOR SOLUTION INTRAVENOUS at 10:34

## 2020-10-27 RX ADMIN — Medication 10 ML: at 09:17

## 2020-10-27 ASSESSMENT — PAIN SCALES - GENERAL
PAINLEVEL_OUTOF10: 0

## 2020-10-27 NOTE — DISCHARGE INSTR - COC
Continuity of Care Form    Patient Name: Parker Martines   :  1960  MRN:  [id number]    Admit date:  10/26/2020  Discharge date:  ***    Code Status Order: Full Code   Advance Directives:   Advance Care Flowsheet Documentation     Date/Time Healthcare Directive Type of Healthcare Directive Copy in [address] Agent's Name Healthcare Agent's Phone Number    10/26/20 2252  No, patient does not have an advance directive for healthcare treatment -- -- -- -- --          Admitting Physician:  Emiliana Henry MD  PCP: No primary care provider on file. Discharging Nurse: [facility] Unit/Room#: /6053-21  Discharging Unit Phone Number: ***    Emergency Contact:   Extended Emergency Contact Information  Primary Emergency Contact: Yordy Etienne [address] Phone: [phone]  Work Phone: [phone]  Mobile Phone: [phone]  Relation: Brother/Sister   needed? No  Secondary Emergency Contact: [address] [place] [address] Phone: [phone]  Work Phone: [phone]  Mobile Phone: [phone]  Relation: Other   needed? No    Past Surgical History:  History reviewed. No pertinent surgical history. Immunization History: There is no immunization history on file for this patient.     Active Problems:  Patient Active Problem List   Diagnosis Code    Essential hypertension I10       Isolation/Infection:   Isolation          No Isolation        Patient Infection Status     None to display          Nurse Assessment:  Last Vital Signs: BP 92/64   Pulse 84   Temp 97.8 °F (36.6 °C) (Oral)   Resp 16   Ht 6' 4\" (1.93 m)   Wt 227 lb 6.4 oz (103.1 kg)   SpO2 98%   BMI 27.68 kg/m²     Last documented pain score (0-10 scale): Pain Level: 0  Last Weight:   Wt Readings from Last 1 Encounters:   10/27/20 227 lb 6.4 oz (103.1 kg)     Mental Status:  {IP PT MENTAL STATUS:}    IV Access:  { SHILOH IV ACCESS:925020503}    Nursing Mobility/ADLs:  Walking {CHP DME YLGC:558058968}  Transfer  {CHP DME BKJO:117237169}  Bathing  {CHP DME BRED:053105328}  Dressing  {CHP DME RUYE:097225635}  Toileting  {CHP DME DIIM:391446099}  Feeding  {CHP DME MMOT:101828643}  Med Admin  {CHP DME MBVF:110772029}  Med Delivery   { SHILOH MED Delivery:890385109}    Wound Care Documentation and Therapy:        Elimination:  Continence:   · Bowel: {YES / XS:71921}  · Bladder: {YES / XS:77559}  Urinary Catheter: {Urinary Catheter:124609979}   Colostomy/Ileostomy/Ileal Conduit: {YES / TJ:15501}       Date of Last BM: ***    Intake/Output Summary (Last 24 hours) at 10/27/2020 1626  Last data filed at 10/27/2020 1425  Gross per 24 hour   Intake 994 ml   Output 300 ml   Net 694 ml     I/O last 3 completed shifts: In: 840 [P.O.:282;  I.V.:712]  Out: 300 [Urine:300]    Safety Concerns:     [address] [employer] Safety Concerns:566124100}    Impairments/Disabilities:      {[facility] Impairments/Disabilities:182105454}    Nutrition Therapy:  Current Nutrition Therapy:   508 myVBO SHILOH Diet List:629151489}    Routes of Feeding: {[facility] DME Other Feedings:627773453}  Liquids: {Slp liquid thickness:99177}  Daily Fluid Restriction: {P DME Yes amt example:435895312}  Last Modified Barium Swallow with Video (Video Swallowing Test): {Done Not Done IZZE:388766931}    Treatments at the Time of Hospital Discharge:   Respiratory Treatments: ***  Oxygen Therapy:  {Therapy; copd oxygen:31436}  Ventilator:    {[facility] LEUZ:118636615}    Rehab Therapies: {THERAPEUTIC INTERVENTION:5814457287}  Weight Bearing Status/Restrictions: 508 myVBO  Weight Bearin}  Other Medical Equipment (for information only, NOT a DME order):  {EQUIPMENT:131059524}  Other Treatments: ***    Patient's personal belongings (please select all that are sent with patient):  {[facility] DME Belongings:268210092}    RN SIGNATURE:  {Esignature:960320853}    CASE MANAGEMENT/SOCIAL WORK SECTION    Inpatient Status Date: ***    Readmission Risk Assessment Score:  Readmission Risk              Risk of Unplanned Readmission:        0           Discharging to Facility/ Agency   · Name:   · Address:  · Phone:  · Fax:    Dialysis Facility (if applicable)   · Name:  · Address:  · Dialysis Schedule:  · Phone:  · Fax:    / signature: {Esignature:593659078}    PHYSICIAN SECTION    Prognosis: {Prognosis:1671318711}    Condition at Discharge: [facility] Patient Condition:233558910}    Rehab Potential (if transferring to Rehab): {Prognosis:2435570805}    Recommended Labs or Other Treatments After Discharge: ***    Physician Certification: I certify the above information and transfer of Jack Duenasgs  is necessary for the continuing treatment of the diagnosis listed and that he requires {Admit to Appropriate Level of Care:20798} for {GREATER/LESS:623504673} 30 days.      Update Admission H&P: {CHP DME Changes in UZ:734261590}    PHYSICIAN SIGNATURE:  {Esignature:945498487}

## 2020-10-27 NOTE — FLOWSHEET NOTE
10/27/20 0400   Vitals   Temp 98 °F (36.7 °C)   Temp Source Oral   Pulse 79   Heart Rate Source Monitor   Resp 16   /71   BP Location Left upper arm   BP Upper/Lower Upper   BP Method Automatic   Patient Position Semi fowlers   Level of Consciousness 0   MEWS Score 1   Patient Currently in Pain Denies   Cardiac Rhythm NSR   Oxygen Therapy   SpO2 96 %   O2 Device None (Room air)   Patient in bed awake,vitals stable. Denies any needs,will continue to monitor.

## 2020-10-27 NOTE — PROGRESS NOTES
Patient educated on discharge instructions as well as new medications use, dosage, administration and possible side effects. Patient verified knowledge. IV removed without difficulty and dry dressing in place. Telemetry monitor removed and returned to [facility]. Pt left facility in stable condition to Home with all of their personal belongings. Pt states no PCP, provided with [id number] [facility] information to schedule follow up visit.

## 2020-10-27 NOTE — CARE COORDINATION
Review of chart for any potential discharge needs. Return visit after leaving AMA. Chest Pain. Stress Test today. No needs identified for discharge intervention at this time. MD and bedside RN  if needs arise please consult case management for discharge intervention. CM not following at this time.

## 2020-10-27 NOTE — H&P
Hospital Medicine History & Physical      PCP: none    Date of Service: Pt seen/examined on 10/26/20 and admitted on 10/26/20 to Observation. Chief Complaint   Patient presents with    Chest Pain     intermittent pain in center of chest since Saturday. pt denies SOB or dizziness. History Of Present Illness: The patient is a 61 y.o. male with PMH below, presents with CP. Pt reports CP which has been intermittent for for the last several days. He describes as sharp, brief (lasting a few seconds), located in precordium/substernal area, non-radiating. He is a smoker and has hx of HTN. Denies SOB, diaphoresis. He says his family convinced him to come back for stress test.       Left AMA on 10/23 prior to getting stress test after admission for similar complaints. Past Medical History:        Diagnosis Date    Hypertension        Past Surgical History:    History reviewed. No pertinent surgical history. Medications Prior to Admission:    Prior to Admission medications    Medication Sig Start Date End Date Taking? Authorizing Provider   lisinopril (PRINIVIL;ZESTRIL) 20 MG tablet TAKE 1 TABLET BY MOUTH EVERY DAY 8/22/20   Historical Provider, MD       Allergies:  Patient has no known allergies. Social History:    TOBACCO:   reports that he has been smoking cigarettes. He has been smoking about 1.00 pack per day. He has never used smokeless tobacco.  ETOH:   reports current alcohol use. Family History:  Reviewed in detail and negative for DM, Early CAD, Cancer (except as below). Positive as follows:    History reviewed. No pertinent family history.     REVIEW OF SYSTEMS:   Pertinent positives/negatives as follows: CP, and as discussed in HPI, otherwise a complete ROS performed and all other systems are negative  PHYSICAL EXAM PERFORMED:    /83   Pulse 84   Temp 98.4 °F (36.9 °C) (Oral)   Resp 12   Ht 6' 4\" (1.93 m)   Wt 228 lb (103.4 kg)   SpO2 98%   BMI 27.75 kg/m²     GEN: A&Ox3, NAD. HEENT:  NC/AT,EOMI, MMM, no erythema/exudates or visible masses. CVS:  Normal S1,S2. RRR. Without M/G/R.   LUNG:   CTA-B. no wheezes, rales or rhonchi  ABD:  Soft, ND/NT, BS+ x4. Without G/R.  EXT: 2+ pulses, no c/c/e. Brisk cap refill. PSY:  Thought process intact, affect appropriate. KRISTIE:  CN III-XII intact, moves all 4 spontaneously, sensory grossly intact. SKIN: No rash or lesions on visible skin. Chart review shows recent radiographs:  Xr Chest (2 Vw)    Result Date: 10/26/2020  EXAMINATION: TWO XRAY VIEWS OF THE CHEST 10/26/2020 8:22 pm COMPARISON: October 22, 2020 HISTORY: ORDERING SYSTEM PROVIDED HISTORY: chest pain TECHNOLOGIST PROVIDED HISTORY: Reason for exam:->chest pain Reason for Exam: chest pain FINDINGS: Cardiac silhouette is normal in size. Lungs appear clear. No acute bony abnormality. No acute findings     Xr Chest Portable    Result Date: 10/22/2020  EXAMINATION: ONE XRAY VIEW OF THE CHEST 10/22/2020 4:53 pm COMPARISON: None. HISTORY: ORDERING SYSTEM PROVIDED HISTORY: chest pain TECHNOLOGIST PROVIDED HISTORY: Reason for exam:->chest pain Reason for Exam: Chest Pain (patient states he had shooting pains through his chest earlier today, but denies any chest pain right now. ) Acuity: Acute Type of Exam: Initial FINDINGS: The cardial-pericardial silhouette is unremarkable in appearance. The lungs are clear. No pneumothorax is found. No free air is seen. No acute bony abnormality. Unremarkable portable chest radiograph.      EKG 12 Lead [[id number]]      Collected: 10/26/20 2016     Updated: 10/26/20 2124      Ventricular Rate  94  BPM     Atrial Rate  94  BPM     P-R Interval  130  ms     QRS Duration  74  ms     Q-T Interval  358  ms     QTc Calculation (Bazett)  447  ms     P Axis  63  degrees     R Axis  20  degrees     T Axis  33  degrees     Diagnosis  Normal sinus rhythmNormal ECGNo previous ECGs available      CBC:  Recent Labs     10/26/20  2016   WBC 11.3* HGB 15.9   HCT 47.2           RENAL  Recent Labs     10/26/20  2016   *   K 3.8      CO2 26   BUN 15   CREATININE 0.9   GLUCOSE 115*     LFT'S:  Recent Labs     10/26/20  2016   AST 19   ALT 24   BILITOT 0.4   ALKPHOS 70       CARDIAC ENZYMES:   Recent Labs     10/26/20  2016   TROPONINI <0.01   trop normal x3 on 10/22-10/23. Lab Results   Component Value Date    PROBNP 271 (H) 10/26/2020    PROBNP 18 10/22/2020        Ref. Range 10/22/2020 23:13   Cholesterol, Fasting Latest Ref Range: 0 - 199 mg/dL 134   HDL Cholesterol Latest Ref Range: 40 - 60 mg/dL 38 (L)   LDL Calculated Latest Ref Range: <100 mg/dL 82   Triglyceride, Fasting Latest Ref Range: 0 - 150 mg/dL 70   VLDL Cholesterol Calculated Latest Ref Range: Not Established mg/dL 14       PHYSICIAN CERTIFICATION  I certify that Reji Woodall is expected to be hospitalized for <2 midnights based on the following assessment and plan:    ASSESSMENT/PLAN:  1. Chest pain, atypical (lasts seconds, non-radiating, no diaphoresis), pain free now, concern for ACS, start ASA. Palpitations? Mg/K normal.  Tele, chk serial troponins. PRN SL nitro for now. Morphine for recurrent CP. Nuc exercise stress. NS @ 75cc/h. Pt was admitted for same on 10/22-10/23 but he left AMA prior to undergoing stress. 2. HTN, cont home regimen. 3. Tobacco Abuse, counseled cessation, 21 mg nicotine patch. DVT Prophylaxis: Lx  Diet: Gen, NPO after MN for stress. Code Status: Full Code  PT/OT Eval Status: Will order if needed and as patient condition allows  Dispo - Admit to obs    Guru Kay MD    Thank you No primary care provider on file. for the opportunity to be involved in this patient's care. If you have any questions or concerns please feel free to contact me via the [employer] Service at [phone]. This chart was generated using the [address] [place] [address] [employer] system.  I created this record but it may contain dictation errors given the limitations of this technology.

## 2020-10-27 NOTE — FLOWSHEET NOTE
10/26/20 2246   Vitals   Temp 98 °F (36.7 °C)   Temp Source Oral   Pulse 86   Heart Rate Source Monitor   Resp 16   /76   BP Location Left upper arm   BP Upper/Lower Upper   Patient Position Sitting   Level of Consciousness 0   MEWS Score 1   Height and Weight   Height 6' 4\" (1.93 m)   Weight 228 lb 14.4 oz (103.8 kg)   Weight Method Actual;Standing scale   BSA (Calculated - sq m) 2.36 sq meters   BMI (Calculated) 27.9   Oxygen Therapy   SpO2 98 %   O2 Device None (Room air)   Shift assessment completed-see flow sheet. Patient in bed awake,alert and oriented x4. Vitals WNL. Medications given per order. Snack provided, patient aware of NPO status after midnight. Patient refused bed alarm, did score low fall risk. Patient denies any further needs at this time, will continue to monitor,call light within reach.

## 2020-10-27 NOTE — PROGRESS NOTES
Patient admitted to room 301 from ER. Patient oriented to room, call light, bed rails, phone, lights and bathroom. Patient instructed about the schedule of the day including: vital sign frequency, lab draws, possible tests, frequency of MD and staff rounds, daily weights, I &O's and prescribed diet. Telemetry box in place, patient aware of placement and reason. Bed locked, in lowest position, side rails up 2/4, call light within reach. Recliner Assessment  Patient is able to demonstrated the ability to move from a reclining position to an upright position within the recliner. 4 Eyes Skin Assessment     The patient is being assess for   Admission    I agree that 2 RN's have performed a thorough Head to Toe Skin Assessment on the patient. ALL assessment sites listed below have been assessed. Areas assessed by both nurses:   [x]   Head, Face, and Ears   [x]   Shoulders, Back, and Chest, Abdomen  [x]   Arms, Elbows, and Hands   []   Coccyx, Sacrum, and Ischium  [x]   Legs, Feet, and Heels        Scattered Bruising, refused sacrum assessment (denies any open areas). **SHARE this note so that the co-signing nurse is able to place an eSignature**    Co-signer eSignature: Electronically signed by Sarah Talbot RN on 10/27/20 at 7:50 AM EDT    Does the Patient have Skin Breakdown?   No          Renny Prevention initiated:  No   Wound Care Orders initiated:  No      [facility] nurse consulted for Pressure Injury (Stage 3,4, Unstageable, DTI, NWPT, Complex wounds)and New or Established Ostomies:  No      Primary Nurse eSignature: Electronically signed by Felisha Patiño RN on 10/27/20 at 12:09 AM EDT

## 2020-10-27 NOTE — PROGRESS NOTES
Vitals:    10/27/20 0912   BP: 113/72   Pulse: 67   Resp: 16   Temp: 98.1 °F (36.7 °C)   SpO2: 98%     Pt in bed. Alert and oriented X4. Shift assessment complete. Pt denies pain. Heart WNL. Active bowel sounds. Lungs diminished + smoker. AM meds. NPO for stress test.    Call light in reach. NS infusing at 75cc/hr. Will monitor.

## 2020-10-27 NOTE — PROGRESS NOTES
PT returned from stress test. Denies pain. Pt aware of NPO status until results are back. Vitals:    10/27/20 1222   BP: 92/64   Pulse: 84   Resp: 16   Temp: 97.8 °F (36.6 °C)   SpO2: 98%     Pt watching tv in bed. IV infusing NS @ 75cc/hr. Will monitor.

## 2020-10-27 NOTE — ED PROVIDER NOTES
I independently evaluated and obtained a history and physical on [facility]. All diagnostic, treatment, and disposition assistants were made to myself in conjunction the advanced practice provider. For further details of this patient's emergency department encounter, please see the advanced practice provider's documentation. History: Patient is a 61-year-old male with a history of tobacco abuse, hypertension, and hyperlipidemia who presents for onset of chest pain today. Patient did have intermittent chest pain last Thursday and came to the emergency department on Friday and had a heart score 4. It was recommended he be admitted for a stress test however the patient refused this. He reports that the chest pain worsened today and his family convinced him to come back. Reports that now he would like to be admitted for stress test.    Physician Exam: Pleasant middle-aged  male in no acute distress. Regular rate and rhythm. Tach distal pulses. No focal neurologic deficits. MDM:    The Ekg interpreted by me shows  normal sinus rhythm with a rate of 94  Axis is   Normal  QTc is  447ms  Intervals and Durations are unremarkable. ST Segments: normal  No significant change from prior EKG dated 10/22/2020    Patient with return of his chest pain. Initial troponin and EKG are unremarkable. He is requesting be admitted for stress testing feel this is appropriate given his heart score of 4. Patient is admitted for further evaluation. FINAL IMPRESSION      1.  Chest pain, unspecified type             Betito Combs MD  10/26/20 2038

## 2020-10-27 NOTE — ED PROVIDER NOTES
[address] ED  EMERGENCY DEPARTMENT ENCOUNTER        Pt Name: Krystal Lopez  MRN: [id number]  Armstrongfarlyn 1960  Date of evaluation: 10/26/2020  Provider: MARY Lopez CNP  PCP: No primary care provider on file. ED Attending: No att. providers found    [facility]       Chief Complaint   Patient presents with    Chest Pain     intermittent pain in center of chest since Saturday. pt denies SOB or dizziness. HISTORY OF PRESENT ILLNESS   (Location/Symptom, Timing/Onset, Context/Setting, Quality, Duration, Modifying Factors, Severity)  Note limiting factors. Krystal Lopez is a 61 y.o. male for chest pain. Onset was today. Context includes patient states that he started having intermittent chest pain last Thursday. He reports that he was seen on Friday and was admitted for a stress test however he reports he was \"stupid\" and and elected to not complete the stress test.  Patient states that he started having intermittent chest pain again today. Patient reports that it is not worse with a deep breath it is not reproducible and it is not worse with exertion. He states that it is a quick pain across his chest that lasts seconds. Alleviating factors include nothing. Aggravating factors include nothing. Pain is 0/10. Nothing has been used for pain today. Nursing Notes were all reviewed and agreed with or any disagreements were addressed  in the HPI. REVIEW OF SYSTEMS  (2-9 systems for level 4, 10 or more for level 5)     Review of Systems   Constitutional: Negative for fever. HENT: Negative for congestion, rhinorrhea and sore throat. Respiratory: Negative for shortness of breath. Cardiovascular: Positive for chest pain. Gastrointestinal: Negative for abdominal pain. Genitourinary: Negative for decreased urine volume and difficulty urinating. Musculoskeletal: Negative for arthralgias and myalgias.    Skin: Negative for color change and rash. Neurological: Negative for dizziness and light-headedness. Psychiatric/Behavioral: Negative for agitation. All other systems reviewed and are negative. Positivesand Pertinent negatives as per HPI. Except as noted above in the ROS, all other systems were reviewed and negative. PAST MEDICAL HISTORY     Past Medical History:   Diagnosis Date    Hypertension          SURGICAL HISTORY     History reviewed. No pertinent surgical history. CURRENT MEDICATIONS       Previous Medications    LISINOPRIL (PRINIVIL;ZESTRIL) 20 MG TABLET    TAKE 1 TABLET BY MOUTH EVERY DAY         ALLERGIES     Patient has no known allergies. FAMILY HISTORY     History reviewed. No pertinent family history.       SOCIAL HISTORY       Social History     Socioeconomic History    Marital status: Single     Spouse name: None    Number of children: None    Years of education: None    Highest education level: None   Occupational History    None   Social Needs    Financial resource strain: None    Food insecurity     Worry: None     Inability: None    Transportation needs     Medical: None     Non-medical: None   Tobacco Use    Smoking status: Current Every Day Smoker     Packs/day: 1.00     Types: Cigarettes    Smokeless tobacco: Never Used   Substance and Sexual Activity    Alcohol use: Yes     Comment: occasional     Drug use: Never    Sexual activity: None   Lifestyle    Physical activity     Days per week: None     Minutes per session: None    Stress: None   Relationships    Social connections     Talks on phone: None     Gets together: None     Attends Nondenominational service: None     Active member of club or organization: None     Attends meetings of clubs or organizations: None     Relationship status: None    Intimate partner violence     Fear of current or ex partner: None     Emotionally abused: None     Physically abused: None     Forced sexual activity: None   Other Topics Concern    None Social History Narrative    None       SCREENINGS    [place] Coma Scale  Eye Opening: Spontaneous  Best Verbal Response: Oriented  Best Motor Response: Obeys commands  [place] Coma Scale Score: 15 Heart Score for chest pain patients  History: Slightly Suspicious  ECG: Normal  Patient Age: > 65 years  *Risk factors for Atherosclerotic disease: Hypertension, Obesity, Cigarette smoking  Risk Factors: > 3 Risk factors or history of atherosclerotic disease*  Troponin: < 1X normal limit  Heart Score Total: 4      PHYSICAL EXAM    (up to 7 for level 4, 8 ormore for level 5)     ED Triage Vitals [10/26/20 2013]   BP Temp Temp Source Pulse Resp SpO2 Height Weight   (!) 152/89 98.4 °F (36.9 °C) Oral 94 18 100 % 6' 4\" (1.93 m) 228 lb (103.4 kg)       Physical Exam  Constitutional:       Appearance: He is well-developed. HENT:      Head: Normocephalic and atraumatic. Neck:      Musculoskeletal: Normal range of motion. Cardiovascular:      Rate and Rhythm: Normal rate. Pulmonary:      Effort: Pulmonary effort is normal. No respiratory distress. Abdominal:      General: There is no distension. Palpations: Abdomen is soft. Tenderness: There is no abdominal tenderness. Musculoskeletal: Normal range of motion. Skin:     General: Skin is warm and dry. Neurological:      Mental Status: He is alert and oriented to person, place, and time.          DIAGNOSTIC RESULTS   LABS:    Labs Reviewed   CBC WITH AUTO DIFFERENTIAL - Abnormal; Notable for the following components:       Result Value    WBC 11.3 (*)     Neutrophils Absolute 8.1 (*)     Basophils Absolute 0.3 (*)     All other components within normal limits    Narrative:     Performed at:  [place] [address],  [place], [facility]   Phone [phone]   COMPREHENSIVE METABOLIC PANEL W/ REFLEX TO MG FOR LOW K - Abnormal; Notable for the following components:    Sodium 134 (*)     Glucose 115 (*)     All other components within normal limits    Narrative:     Performed at:  [facility] - [facility] [address],  [place], [facility]   Phone [phone]   BRAIN NATRIURETIC PEPTIDE - Abnormal; Notable for the following components:    Pro- (*)     All other components within normal limits    Narrative:     Performed at:  [facility] [address],  [place], [facility]   Phone [phone]   TROPONIN    Narrative:     Performed at:  [facility]) - [facility] [address],  [place], [facility]   Phone [phone]   MAGNESIUM       All other labs were within normal range or not returned as of this dictation. EKG: All EKG's are interpreted by the Emergency Department Physician who either signs or Co-signs this chart in the absence of a cardiologist.  Please see their note for interpretation of EKG. RADIOLOGY:   Chest x-ray interpreted by radiologist for cardiac silhouette is normal in size. Lungs appear clear. No acute bony abnormality. Interpretation per the Radiologist below, if available at the time of this note:    XR CHEST (2 VW)   Final Result   No acute findings           No results found. PROCEDURES   Unless otherwise noted below, none     Procedures    CRITICAL CARE TIME   N/A    CONSULTS:  IP CONSULT TO HOSPITALIST      EMERGENCY DEPARTMENT COURSE and DIFFERENTIAL DIAGNOSIS/MDM:   Vitals:    Vitals:    10/26/20 2013 10/26/20 2015 10/26/20 2104 10/26/20 2105   BP: (!) 152/89 (!) 152/89  114/83   Pulse: 94  87 84   Resp: 18 18 12 12   Temp: 98.4 °F (36.9 °C)      TempSrc: Oral      SpO2: 100% 99% 100% 98%   Weight: 228 lb (103.4 kg)      Height: 6' 4\" (1.93 m)          Patient was given the following medications:  Medications   aspirin tablet 325 mg (325 mg Oral Given 10/26/20 2033)         Patient was seen and evaluated by Dr. Teresa Bruce myself. Patient here for concerns for chest pain.   Patient reports

## 2020-10-27 NOTE — ED NOTES
Handoff report to José Levin RN who will now assume care for this pt at this time     Kenn Ballesteros RN  10/26/20 3368

## 2020-10-28 LAB
EKG ATRIAL RATE: 62 BPM
EKG ATRIAL RATE: 94 BPM
EKG DIAGNOSIS: NORMAL
EKG DIAGNOSIS: NORMAL
EKG P AXIS: 63 DEGREES
EKG P AXIS: 68 DEGREES
EKG P-R INTERVAL: 130 MS
EKG P-R INTERVAL: 138 MS
EKG Q-T INTERVAL: 358 MS
EKG Q-T INTERVAL: 412 MS
EKG QRS DURATION: 74 MS
EKG QRS DURATION: 90 MS
EKG QTC CALCULATION (BAZETT): 418 MS
EKG QTC CALCULATION (BAZETT): 447 MS
EKG R AXIS: 20 DEGREES
EKG R AXIS: 66 DEGREES
EKG T AXIS: 33 DEGREES
EKG T AXIS: 74 DEGREES
EKG VENTRICULAR RATE: 62 BPM
EKG VENTRICULAR RATE: 94 BPM

## 2020-10-28 PROCEDURE — 93010 ELECTROCARDIOGRAM REPORT: CPT | Performed by: INTERNAL MEDICINE

## 2020-10-28 NOTE — DISCHARGE SUMMARY
Name:  Zaid Lyles  Room:  /0301-01  MRN:    [id number]    Discharge Summary      This discharge summary is in conjunction with a complete physical exam done on the day of discharge. Attending Physician: Dr. Gabino Douglas  Discharging Physician: Dr. Felisha Magallanes: 10/26/2020  Discharge:  10/27/2020    HPI:  The patient is a 61 y.o. male with PMH below, presents with CP. Pt reports CP which has been intermittent for for the last several days. He describes as sharp, brief (lasting a few seconds), located in precordium/substernal area, non-radiating. He is a smoker and has hx of HTN. Denies SOB, diaphoresis. He says his family convinced him to come back for stress test.       Left AMA on 10/23 prior to getting stress test after admission for similar complaints. Diagnoses this Admission and Hospital Course   Chest pain  - admitted to PCU on tele  - atypical. Pain free now  - started aspirin. Serial troponin negative  - PRN Nitro, Morphine  - NM Stress negative. Hypertension  - BP stable. Continued Lisinopril. Tobacco Dependence  -Recommended cessation  - nicotine patch ordered       Procedures (Please Review Full Report for Details)  N/A    Consults    N/A      Physical Exam at Discharge:    BP 92/64   Pulse 84   Temp 97.8 °F (36.6 °C) (Oral)   Resp 16   Ht 6' 4\" (1.93 m)   Wt 227 lb 6.4 oz (103.1 kg)   SpO2 98%   BMI 27.68 kg/m²   General:  Awake, alert, NAD  Skin:  Warm and dry  Neck:  JVD absent. Neck supple  Chest:  Clear to auscultation, respiration easy. No wheezes, rales or rhonchi. Cardiovascular:  RRR ,S1S2 normal  Abdomen:  Soft, non tender, non distended, BS +  Extremities:  No edema. Intact peripheral pulses.  Brisk cap refill, < 2 secs  Neuro: non focal      CBC:   Recent Labs     10/26/20  2016 10/27/20  0313   WBC 11.3* 8.4   HGB 15.9 14.4   HCT 47.2 42.6   MCV 98.0 98.7    190     BMP:   Recent Labs     10/26/20  2016 10/27/20  0313   * 132*   K 3.8 3.8  103   CO2 26 23   BUN 15 15   CREATININE 0.9 0.8     LIVER PROFILE:   Recent Labs     10/26/20  2016 10/27/20  0313   AST 19 16   ALT 24 18   BILITOT 0.4 0.3   ALKPHOS [address] ENZYMES  Recent Labs     10/26/20  2016 10/26/20  2322 10/27/20  0313   TROPONINI <0.01 <0.01 <0.01         CULTURES  N/A    RADIOLOGY  NM Cardiac Stress Test Nuclear Imaging   Final Result      XR CHEST (2 VW)   Final Result   No acute findings           NM Stress 10/27/2020   Summary    Diaphragmatic artifact is present. There is no evidence of myocardial    ischemia or scar. Hyperdynamic post-stress LVEF of 78%. Normal study. Discharge Medications     Medication List      CONTINUE taking these medications    lisinopril 20 MG tablet  Commonly known as:  PRINIVIL;ZESTRIL              Discharged in stable condition to home. Follow Up: Follow up with PCP.       Suzi Sandra MD  10/27/2020